# Patient Record
Sex: MALE | Race: WHITE | NOT HISPANIC OR LATINO | Employment: FULL TIME | URBAN - NONMETROPOLITAN AREA
[De-identification: names, ages, dates, MRNs, and addresses within clinical notes are randomized per-mention and may not be internally consistent; named-entity substitution may affect disease eponyms.]

---

## 2019-01-21 ENCOUNTER — OUTSIDE FACILITY SERVICE (OUTPATIENT)
Dept: CARDIOLOGY | Facility: CLINIC | Age: 69
End: 2019-01-21

## 2019-01-21 PROCEDURE — 93306 TTE W/DOPPLER COMPLETE: CPT | Performed by: INTERNAL MEDICINE

## 2019-01-30 ENCOUNTER — OFFICE VISIT (OUTPATIENT)
Dept: CARDIOLOGY | Facility: CLINIC | Age: 69
End: 2019-01-30

## 2019-01-30 VITALS
DIASTOLIC BLOOD PRESSURE: 64 MMHG | SYSTOLIC BLOOD PRESSURE: 108 MMHG | WEIGHT: 297 LBS | OXYGEN SATURATION: 96 % | BODY MASS INDEX: 39.36 KG/M2 | HEART RATE: 79 BPM | HEIGHT: 73 IN

## 2019-01-30 DIAGNOSIS — R07.9 CHEST PAIN, UNSPECIFIED TYPE: ICD-10-CM

## 2019-01-30 DIAGNOSIS — I49.3 FREQUENT PVCS: Primary | ICD-10-CM

## 2019-01-30 DIAGNOSIS — Z86.718 HISTORY OF DVT (DEEP VEIN THROMBOSIS): ICD-10-CM

## 2019-01-30 DIAGNOSIS — I47.20 VENTRICULAR TACHYCARDIA (HCC): ICD-10-CM

## 2019-01-30 DIAGNOSIS — R94.31 ABNORMAL HOLTER MONITOR FINDING: ICD-10-CM

## 2019-01-30 PROBLEM — E11.9 TYPE 2 DIABETES MELLITUS (HCC): Status: ACTIVE | Noted: 2019-01-30

## 2019-01-30 PROBLEM — E66.9 OBESE: Status: ACTIVE | Noted: 2019-01-30

## 2019-01-30 PROBLEM — D68.2 FACTOR V DEFICIENCY (HCC): Status: ACTIVE | Noted: 2019-01-30

## 2019-01-30 PROCEDURE — 99204 OFFICE O/P NEW MOD 45 MIN: CPT | Performed by: INTERNAL MEDICINE

## 2019-01-30 NOTE — PROGRESS NOTES
Reason for Visit: cardiovascular follow up.    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  He recently fell on black ice about a week and a half ago.  He landed on his left side of the chest causing significant trauma.  He has not felt right ever since then.  He has a left-sided chest pain.  He has had periods of dizziness, lightheadedness, palpitations, and bradycardia.  He had an echocardiogram done that demonstrated a structurally normal heart.  He had a Holter monitor done that showed frequent PVCs with runs of nonsustained VT.  He reports history of a DVT about 2 years ago and has remained anticoagulated with Eliquis since that time.  He reports laboratory testing demonstrated factor V Leiden deficiency.    Previous Cardiac Testing and Procedures:  - Echo (01/21/2019) EF 65%, normal diastolic function, normal valves  - Holter monitor (01/27/2018) frequent PVCs, 31% of total heartbeats, runs of nonsustained VT at a maximal rate of 145 bpm, frequent atrial ectopic beats at 3511   - EKG (01/30/2019) normal sinus rhythm with frequent PVCs in a bigeminal pattern    Patient Active Problem List   Diagnosis   • History of DVT (deep vein thrombosis)   • Type 2 diabetes mellitus (CMS/HCC)   • Factor V deficiency (CMS/HCC)   • Obese       Social History     Tobacco Use   • Smoking status: Never Smoker   • Smokeless tobacco: Never Used   Substance Use Topics   • Alcohol use: No     Frequency: Never   • Drug use: Not on file       Family History   Problem Relation Age of Onset   • Heart disease Mother         CABG   • Coronary artery disease Father    • Heart disease Father         CABG   • Lymphoma Father    • Heart disease Brother    • Coronary artery disease Brother         HAS 1 STENT        The following portions of the patient's history were reviewed and updated as appropriate: allergies, current  "medications, past family history, past medical history, past social history, past surgical history and problem list.      Current Outpatient Medications:   •  apixaban (ELIQUIS) 5 MG tablet tablet, Take 5 mg by mouth 2 (Two) Times a Day., Disp: , Rfl:   •  Canagliflozin (INVOKANA) 300 MG tablet, Take 300 mg by mouth Daily., Disp: , Rfl:   •  Cholecalciferol (VITAMIN D3) 5000 units capsule capsule, Take 1,000 Units by mouth Daily., Disp: , Rfl:   •  metFORMIN (GLUCOPHAGE) 1000 MG tablet, Take 1,000 mg by mouth 2 (Two) Times a Day With Meals., Disp: , Rfl:   •  SITagliptin (JANUVIA) 100 MG tablet, Take 100 mg by mouth Daily., Disp: , Rfl:   •  Unable to find, 1 each 1 (One) Time. VITAMIN B12 MONTLY INJECTION, Disp: , Rfl:     Review of Systems   Constitution: Negative for chills, fever and weight loss.   HENT: Negative for sore throat.    Eyes: Negative for blurred vision and visual disturbance.   Cardiovascular: Positive for chest pain, dyspnea on exertion and palpitations. Negative for leg swelling, paroxysmal nocturnal dyspnea and syncope.   Respiratory: Positive for shortness of breath. Negative for cough.    Endocrine: Negative for cold intolerance and polyuria.   Skin: Positive for color change. Negative for itching and rash.   Musculoskeletal: Positive for myalgias and stiffness. Negative for joint swelling.   Gastrointestinal: Negative for abdominal pain, diarrhea and vomiting.   Genitourinary: Negative for dysuria and hematuria.   Neurological: Positive for dizziness and light-headedness. Negative for headaches and numbness.   Psychiatric/Behavioral: Negative for depression. The patient is not nervous/anxious.    Allergic/Immunologic: Negative for hives.       Objective   /64 (BP Location: Left arm, Patient Position: Sitting, Cuff Size: Adult)   Pulse 79   Ht 185.4 cm (73\")   Wt 135 kg (297 lb)   SpO2 96%   BMI 39.18 kg/m²   Physical Exam   Constitutional: He is oriented to person, place, and time. " He appears well-developed and well-nourished.   HENT:   Head: Normocephalic and atraumatic.   Eyes: Conjunctivae and EOM are normal. Pupils are equal, round, and reactive to light.   Neck: Normal range of motion. Neck supple. No JVD present. No thyromegaly present.   Cardiovascular: Normal rate, regular rhythm and normal heart sounds.   No murmur heard.  Pulmonary/Chest: Effort normal and breath sounds normal. He has no wheezes. He has no rales.   Abdominal: Soft. Bowel sounds are normal. He exhibits distension (obese). There is no tenderness.   Musculoskeletal: Normal range of motion. He exhibits no edema.   Neurological: He is alert and oriented to person, place, and time. Coordination normal.   Skin: Skin is warm and dry. No rash noted.   Psychiatric: He has a normal mood and affect. His behavior is normal.     Procedures      ICD-10-CM ICD-9-CM   1. Frequent PVCs I49.3 427.69   2. Ventricular tachycardia (CMS/HCC) I47.2 427.1   3. Abnormal Holter monitor finding R94.31 794.31   4. Chest pain, unspecified type R07.9 786.50   5. History of DVT (deep vein thrombosis) Z86.718 V12.51         Assessment/Plan:  1.  Frequent PVCs: 31% total heartbeats.  He appears to be symptomatic.  Structurally normal heart on echo.  If ischemic evaluation is negative will require referral to EP.    2.  Ventricular tachycardia: Runs of nonsustained VT noted on Holter monitor.  Will evaluate for ischemia with a nuclear stress test.  If this is negative we will then plan referral to EP.    3.  Abnormal Holter monitor: Frequent PVCs and runs of nonsustained VT.    4.  Chest pain: Most likely due to recent chest wall trauma.  Given runs of nonsustained VT on Holter monitor will evaluate further with a nuclear stress test.    5.  History of DVT: Diagnosed about 2 years ago.  Associated with factor V Leiden deficiency.  Managed on chronic anticoagulation with Eliquis.

## 2019-02-04 ENCOUNTER — OUTSIDE FACILITY SERVICE (OUTPATIENT)
Dept: CARDIOLOGY | Facility: CLINIC | Age: 69
End: 2019-02-04

## 2019-02-04 PROCEDURE — 93018 CV STRESS TEST I&R ONLY: CPT | Performed by: INTERNAL MEDICINE

## 2019-02-05 ENCOUNTER — TELEPHONE (OUTPATIENT)
Dept: CARDIOLOGY | Facility: CLINIC | Age: 69
End: 2019-02-05

## 2019-02-05 DIAGNOSIS — I49.3 FREQUENT PVCS: ICD-10-CM

## 2019-02-05 DIAGNOSIS — I47.20 VENTRICULAR TACHYCARDIA (HCC): Primary | ICD-10-CM

## 2019-02-05 NOTE — TELEPHONE ENCOUNTER
I tried to call him but was unable to reach him.  Please let him know that his nuclear stress test showed normal blood flow to his heart and no evidence of blockages.  As we discussed in his office visit, if the stress test was normal.  Need a referral to EP.  I have placed this referral which should be scheduled as soon as possible.

## 2019-02-07 NOTE — TELEPHONE ENCOUNTER
Spoke to pt.  He is inpatient at [facility].  He stated that he knows a doctor there and called to see if he would order a cardiac MRI for him.  He did so as inpatient so he could also have a EP review same day.

## 2020-05-08 ENCOUNTER — OUTSIDE FACILITY SERVICE (OUTPATIENT)
Dept: CARDIOLOGY | Facility: CLINIC | Age: 70
End: 2020-05-08

## 2020-05-08 PROCEDURE — 93306 TTE W/DOPPLER COMPLETE: CPT | Performed by: INTERNAL MEDICINE

## 2021-04-21 ENCOUNTER — OUTSIDE FACILITY SERVICE (OUTPATIENT)
Dept: CARDIOLOGY | Facility: CLINIC | Age: 71
End: 2021-04-21

## 2021-04-21 PROCEDURE — 93018 CV STRESS TEST I&R ONLY: CPT | Performed by: INTERNAL MEDICINE

## 2021-04-21 PROCEDURE — 93350 STRESS TTE ONLY: CPT | Performed by: INTERNAL MEDICINE
